# Patient Record
Sex: FEMALE | URBAN - METROPOLITAN AREA
[De-identification: names, ages, dates, MRNs, and addresses within clinical notes are randomized per-mention and may not be internally consistent; named-entity substitution may affect disease eponyms.]

---

## 2022-12-13 ENCOUNTER — APPOINTMENT (RX ONLY)
Dept: URBAN - METROPOLITAN AREA CLINIC 2 | Facility: CLINIC | Age: 37
Setting detail: DERMATOLOGY
End: 2022-12-13

## 2022-12-13 DIAGNOSIS — L65.8 OTHER SPECIFIED NONSCARRING HAIR LOSS: ICD-10-CM | Status: INADEQUATELY CONTROLLED

## 2022-12-13 PROCEDURE — ? TREATMENT REGIMEN

## 2022-12-13 PROCEDURE — ? PRESCRIPTION

## 2022-12-13 PROCEDURE — 99204 OFFICE O/P NEW MOD 45 MIN: CPT

## 2022-12-13 PROCEDURE — ? COUNSELING

## 2022-12-13 RX ORDER — MINOXIDIL 2.5 MG/1
1 TABLET ORAL QHS
Qty: 45 | Refills: 4 | Status: ERX | COMMUNITY
Start: 2022-12-13

## 2022-12-13 RX ORDER — FINASTERIDE 5 MG/1
1 TABLET, FILM COATED ORAL QHS
Qty: 45 | Refills: 4 | Status: ERX | COMMUNITY
Start: 2022-12-13

## 2022-12-13 RX ADMIN — FINASTERIDE 1: 5 TABLET, FILM COATED ORAL at 00:00

## 2022-12-13 RX ADMIN — MINOXIDIL 1: 2.5 TABLET ORAL at 00:00

## 2022-12-13 ASSESSMENT — LOCATION ZONE DERM: LOCATION ZONE: SCALP

## 2022-12-13 ASSESSMENT — LOCATION DETAILED DESCRIPTION DERM: LOCATION DETAILED: HAIR

## 2022-12-13 ASSESSMENT — LOCATION SIMPLE DESCRIPTION DERM: LOCATION SIMPLE: HAIR

## 2022-12-13 NOTE — HPI: SKIN LESION
Is This A New Presentation, Or A Follow-Up?: Skin Lesion
How Severe Is Your Skin Lesion?: moderate
Has Your Skin Lesion Been Treated?: not been treated
Additional History: Pt reports 20 years ago she had a friend who thought she knew how to place straightening/perming collation in hair after watching another hairdresser. During procedure, pt noted that she started to feel some burning on her scalp and ended up being burned by the solution. Pt reports she lost a significant amount of hair on the top of her hair where the solution was placed. Pt states she has to part her hair over to the side to help cover balding/thinning area. Pt notes over the last 4-5 months the area seems to be thinning and further growing, pt feels she has to part her hair over further to the side to cover area. Pt is hoping to avoid further loss. The patient denies current symptoms of burning or itching of the scalp. She denies noted redness. She currently uses coconut oils on the hair to help moisturize. She washes her hair approximately 2-3 times weekly. She denies a known family history of hair loss with aging.

## 2022-12-13 NOTE — PROCEDURE: TREATMENT REGIMEN
Detail Level: Simple
Plan: Discussed addition of finasteride 5mg tab, 1/2 tab daily. Informed or there is a 40-50% chance of noticing an improvement. We can also add oral monoxide 2.5mg tab, starting out with 1/4 tab QHS x2-3 weeks. If pt does not experience fatigue, headaches, lower leg swelling, pt can increase up to 1/2 tab QHS. If pt wants, she can start once medication and if no symptoms after 1 month, pt can add if she is worried about possible adverse reactions with either or medication. Informed pt it can take 6-9 months to notice an improvement

## 2023-09-07 ENCOUNTER — APPOINTMENT (RX ONLY)
Dept: URBAN - METROPOLITAN AREA CLINIC 41 | Facility: CLINIC | Age: 38
Setting detail: DERMATOLOGY
End: 2023-09-07

## 2023-09-07 DIAGNOSIS — L65.8 OTHER SPECIFIED NONSCARRING HAIR LOSS: ICD-10-CM

## 2023-09-07 PROCEDURE — ? PRESCRIPTION

## 2023-09-07 PROCEDURE — ? COUNSELING

## 2023-09-07 PROCEDURE — 99214 OFFICE O/P EST MOD 30 MIN: CPT

## 2023-09-07 PROCEDURE — ? TREATMENT REGIMEN

## 2023-09-07 RX ORDER — MINOXIDIL 2.5 MG/1
1 TABLET ORAL QHS
Qty: 45 | Refills: 11 | Status: ERX

## 2023-09-07 RX ORDER — FINASTERIDE 5 MG/1
1 TABLET, FILM COATED ORAL QHS
Qty: 30 | Refills: 11 | Status: ERX

## 2023-09-07 ASSESSMENT — LOCATION SIMPLE DESCRIPTION DERM: LOCATION SIMPLE: HAIR

## 2023-09-07 ASSESSMENT — LOCATION ZONE DERM: LOCATION ZONE: SCALP

## 2023-09-07 ASSESSMENT — LOCATION DETAILED DESCRIPTION DERM: LOCATION DETAILED: HAIR

## 2023-09-07 NOTE — PROCEDURE: TREATMENT REGIMEN
Detail Level: Simple
Plan: Pt has not initiated finasteride. Discussed addition of finasteride 5mg tab, 1/2 tab daily. Informed or there is a 40-50% chance of noticing an improvement. Continue oral monoxide 2.5mg 1/2 tab QHS. Pt will call in 4-6 months to inform us if hair growth has been seen. Can call in spironolactone at pt request.\\n\\nPt inquired about Nutrafol as she has seen ads on TV and is interested. Let to know that it is just a supplement and there are no double blind studies to prove that it can help with decreasing hair loss. \\nDiscussed PRP injections into scalp (pt aware that insurance will not cover)\\nDiscussed diode laser caps (can purchase online)

## 2025-01-28 ENCOUNTER — RX ONLY (RX ONLY)
Age: 40
End: 2025-01-28

## 2025-01-28 RX ORDER — FINASTERIDE 5 MG/1
1 TABLET, FILM COATED ORAL QHS
Qty: 30 | Refills: 0 | Status: ERX